# Patient Record
Sex: MALE | Race: BLACK OR AFRICAN AMERICAN | HISPANIC OR LATINO | Employment: FULL TIME | URBAN - METROPOLITAN AREA
[De-identification: names, ages, dates, MRNs, and addresses within clinical notes are randomized per-mention and may not be internally consistent; named-entity substitution may affect disease eponyms.]

---

## 2017-02-24 ENCOUNTER — TELEPHONE (OUTPATIENT)
Dept: HEPATOLOGY | Facility: CLINIC | Age: 37
End: 2017-02-24

## 2017-02-24 NOTE — TELEPHONE ENCOUNTER
Pt due for [facility].     MA spoke with patient lab appt schedule in [facility]. Recall enter for next lab.

## 2017-02-24 NOTE — TELEPHONE ENCOUNTER
----- Message from Linette Levy sent at 2/21/2017  1:53 PM CST -----  Contact: patient wife Ellie   Calling to speak with someone about a letter she received in the mail. Please call

## 2017-03-01 ENCOUNTER — TELEPHONE (OUTPATIENT)
Dept: HEPATOLOGY | Facility: CLINIC | Age: 37
End: 2017-03-01

## 2017-03-01 NOTE — TELEPHONE ENCOUNTER
----- Message from Clarisa Gomez sent at 3/1/2017  1:32 PM CST -----  Contact: Ellie/ boubacar  [phone]  Called regarding a letter she received from [email or web address], have been waiting on a call back [phone]

## 2017-03-01 NOTE — TELEPHONE ENCOUNTER
MA called patient wife, per wife they received a bill from [facility] $408 for fibroscan procedure that we did 11/3/16. She stated that before we do the procedure that we got the approval from [employer] so why now why they received a bill?

## 2017-03-14 ENCOUNTER — TELEPHONE (OUTPATIENT)
Dept: HEPATOLOGY | Facility: CLINIC | Age: 37
End: 2017-03-14

## 2017-03-14 NOTE — TELEPHONE ENCOUNTER
----- Message from Rosangela Salcedo sent at 3/14/2017  1:52 PM CDT -----  Hi, I do apologize not responding sooner,  I reviewed the denial and I copied the reason code that the ins co is stating below that it was experimental and they are not paying based upon the diagnosis. The reason code states below:11[facility]18/16 [facility]  [id number]  Remittance Codes Mapped To Selected  [id number], PX DEEMED EXPERIMENTAL. [id number], PX DEEMED EXPERIMENTAL.    11/18/16 Research on [employer]  [id number]   Remittance Codes Mapped To Selected  N623 [id number]-NOT CVRD WHEN DEEMED UNPROVEN/EXPERIMENTAL

## 2017-03-17 ENCOUNTER — TELEPHONE (OUTPATIENT)
Dept: HEPATOLOGY | Facility: CLINIC | Age: 37
End: 2017-03-17

## 2017-03-17 NOTE — TELEPHONE ENCOUNTER
MA called patient back regarding bill for Fibroscan.     No answer, left message to give office a call back.     E-mail also sent to Benjamin Garrison (fibroscan rep) on appeal information (process) regarding fibroscan denials.

## 2017-03-17 NOTE — TELEPHONE ENCOUNTER
----- Message from Deon Callejas sent at 3/17/2017 11:21 AM CDT -----  Contact: Mrs Adams  Need to get an update ASAP please call

## 2017-03-17 NOTE — TELEPHONE ENCOUNTER
MA spoke with pt's wife, explained to her we are currently working on fibro scan denial. I explained to Mrs. Adams the appeal process can take some time. She can call us back in 1wk for a status update. Pt's wife verbalized understanding.

## 2017-03-21 NOTE — TELEPHONE ENCOUNTER
Spoke with patient's wife stated she will e-mail me a copy of the [facility]

## 2017-04-07 ENCOUNTER — TELEPHONE (OUTPATIENT)
Dept: HEPATOLOGY | Facility: CLINIC | Age: 37
End: 2017-04-07

## 2017-04-07 NOTE — TELEPHONE ENCOUNTER
----- Message from Deon Callejas sent at 4/7/2017 10:26 AM CDT -----  Contact: Mrs Adams  Need to speak with you regarding an appeal please call

## 2017-04-17 ENCOUNTER — TELEPHONE (OUTPATIENT)
Dept: HEPATOLOGY | Facility: CLINIC | Age: 37
End: 2017-04-17

## 2017-04-17 NOTE — TELEPHONE ENCOUNTER
Spoke with Christina ([employer]). Claim was resubmitted, still pending. Pt can call [phone] to check status.     MA spoke with pt's wife, message given. She verbalized understanding. Number given to check status.

## 2017-10-04 ENCOUNTER — OFFICE VISIT (OUTPATIENT)
Dept: INTERNAL MEDICINE | Facility: CLINIC | Age: 37
End: 2017-10-04
Payer: COMMERCIAL

## 2017-10-04 VITALS
DIASTOLIC BLOOD PRESSURE: 60 MMHG | OXYGEN SATURATION: 98 % | WEIGHT: 213.88 LBS | HEIGHT: 68 IN | SYSTOLIC BLOOD PRESSURE: 121 MMHG | BODY MASS INDEX: 32.41 KG/M2 | HEART RATE: 60 BPM

## 2017-10-04 DIAGNOSIS — J30.9 ALLERGIC RHINITIS, UNSPECIFIED CHRONICITY, UNSPECIFIED SEASONALITY, UNSPECIFIED TRIGGER: ICD-10-CM

## 2017-10-04 DIAGNOSIS — Z13.6 ENCOUNTER FOR LIPID SCREENING FOR CARDIOVASCULAR DISEASE: ICD-10-CM

## 2017-10-04 DIAGNOSIS — Z13.220 ENCOUNTER FOR LIPID SCREENING FOR CARDIOVASCULAR DISEASE: ICD-10-CM

## 2017-10-04 DIAGNOSIS — Z13.21 ENCOUNTER FOR VITAMIN DEFICIENCY SCREENING: ICD-10-CM

## 2017-10-04 DIAGNOSIS — R74.8 ELEVATED CPK: ICD-10-CM

## 2017-10-04 DIAGNOSIS — Z00.00 ANNUAL PHYSICAL EXAM: Primary | ICD-10-CM

## 2017-10-04 PROCEDURE — 99999 PR PBB SHADOW E&M-EST. PATIENT-LVL III: CPT | Mod: PBBFAC,,, | Performed by: FAMILY MEDICINE

## 2017-10-04 PROCEDURE — 99395 PREV VISIT EST AGE 18-39: CPT | Mod: S$GLB,,, | Performed by: FAMILY MEDICINE

## 2017-10-04 RX ORDER — AZELASTINE 1 MG/ML
1 SPRAY, METERED NASAL 2 TIMES DAILY
Qty: 30 ML | Refills: 3 | Status: SHIPPED | OUTPATIENT
Start: 2017-10-04 | End: 2018-10-04

## 2017-10-04 RX ORDER — FLUTICASONE PROPIONATE 50 MCG
1 SPRAY, SUSPENSION (ML) NASAL DAILY
Qty: 16 G | Refills: 3 | Status: SHIPPED | OUTPATIENT
Start: 2017-10-04

## 2017-10-04 RX ORDER — AZELASTINE HYDROCHLORIDE, FLUTICASONE PROPIONATE 137; 50 UG/1; UG/1
1 SPRAY, METERED NASAL 2 TIMES DAILY
Qty: 23 G | Refills: 6 | Status: SHIPPED | OUTPATIENT
Start: 2017-10-04 | End: 2017-10-04

## 2017-10-04 NOTE — PROGRESS NOTES
Subjective:       Patient ID: Yoandy Adams is a 37 y.o. male.    Chief Complaint: Annual Exam    HPI   38 y/o male here for annual exam. He denies f/n/v/d/constipation/cp/palpitations/sob/urinary sx.  He gets itchy eyes, scratchy throat, sneezing and post nasal drip, he was seen by an Allergist, Tammie Norton, he was given Dymista which was helpful.  Sleeping ok, appetite good, mood good. He is active and exercising regularly and doing weights.  Hx of alcohol abuse, at age 23 he stopped alcohol,followed by hepatology  Vaccines: not sure about vaccines, he went to [facility] in 2012, will send pic of vaccine card  Eye exam due  Dental due    Review of Systems   Constitutional: Negative for activity change, appetite change, fatigue and fever.   HENT: Positive for congestion, postnasal drip, rhinorrhea and sneezing. Negative for ear discharge, ear pain, sore throat and trouble swallowing.    Eyes: Positive for itching.   Respiratory: Negative for cough and shortness of breath.    Cardiovascular: Negative for chest pain, palpitations and leg swelling.   Gastrointestinal: Negative for constipation, diarrhea, nausea and vomiting.   Genitourinary: Negative for difficulty urinating and dysuria.   Skin: Negative for rash.   Neurological: Negative for dizziness and light-headedness.   Psychiatric/Behavioral: Negative for sleep disturbance.       Objective:      Physical Exam   Constitutional: He appears well-developed and well-nourished.   HENT:   Head: Normocephalic and atraumatic.   Mouth/Throat: No oropharyngeal exudate.   Mild posterior pharyngeal erythema  Inflamed nasal turbinates bilaterally     Neck: Normal range of motion. Neck supple. No thyromegaly present.   Cardiovascular: Normal rate, regular rhythm and normal heart sounds.    Pulmonary/Chest: Effort normal and breath sounds normal. No respiratory distress.   Abdominal: Soft. Bowel sounds are normal. He exhibits no distension.   Musculoskeletal: Normal range of  motion. He exhibits no edema.   Strength 5/5 bilateral upper and lower extremity     Lymphadenopathy:     He has no cervical adenopathy.   Neurological: He is alert.   Skin: Skin is warm and dry.   Psychiatric: He has a normal mood and affect.   Nursing note and vitals reviewed.      Assessment:       1. Annual physical exam    2. Elevated CPK    3. Allergic rhinitis, unspecified chronicity, unspecified seasonality, unspecified trigger    4. Encounter for lipid screening for cardiovascular disease    5. Encounter for vitamin deficiency screening        Plan:       Yoandy was seen today for annual exam.    Diagnoses and all orders for this visit:    Annual physical exam  -     CBC auto differential; Future  -     Comprehensive metabolic panel; Future  -     TSH; Future  -     Hemoglobin A1c; Future    Elevated CPK  -     CK; Future    Allergic rhinitis, unspecified chronicity, unspecified seasonality, unspecified trigger  -     azelastine-fluticasone (DYMISTA) 137-50 mcg/spray Spry nassal spray; 1 spray by Each Nare route 2 (two) times daily.    Encounter for lipid screening for cardiovascular disease  -     Lipid panel; Future    Encounter for vitamin deficiency screening  -     Vitamin D; Future    Other orders  -     Cancel: Tdap Vaccine

## 2017-10-04 NOTE — TELEPHONE ENCOUNTER
----- Message from Brodie Vásquez sent at 10/4/2017 11:31 AM CDT -----  Contact: Leeanne       _chiquis  1st Request  _  2nd Request  _  3rd Request    Please refill the medication(s) listed below. Please call the patient when the prescription(s) is ready for  at this phone number    [phone] (h)         Medication #1: azelastine-fluticasone (DYMISTA) 137-50 mcg/spray Spry nassal spray    # Pt requesting two separate sprays .       Preferred Pharmacy:[facility] [id number] [address]

## 2017-10-05 ENCOUNTER — LAB VISIT (OUTPATIENT)
Dept: LAB | Facility: HOSPITAL | Age: 37
End: 2017-10-05
Attending: FAMILY MEDICINE
Payer: COMMERCIAL

## 2017-10-05 DIAGNOSIS — Z13.21 ENCOUNTER FOR VITAMIN DEFICIENCY SCREENING: ICD-10-CM

## 2017-10-05 DIAGNOSIS — R74.8 ELEVATED CPK: ICD-10-CM

## 2017-10-05 DIAGNOSIS — Z00.00 ANNUAL PHYSICAL EXAM: ICD-10-CM

## 2017-10-05 DIAGNOSIS — Z13.220 ENCOUNTER FOR LIPID SCREENING FOR CARDIOVASCULAR DISEASE: ICD-10-CM

## 2017-10-05 DIAGNOSIS — Z13.6 ENCOUNTER FOR LIPID SCREENING FOR CARDIOVASCULAR DISEASE: ICD-10-CM

## 2017-10-05 LAB
25(OH)D3+25(OH)D2 SERPL-MCNC: 24 NG/ML
ALBUMIN SERPL BCP-MCNC: 3.6 G/DL
ALP SERPL-CCNC: 141 U/L
ALT SERPL W/O P-5'-P-CCNC: 84 U/L
ANION GAP SERPL CALC-SCNC: 8 MMOL/L
AST SERPL-CCNC: 40 U/L
BASOPHILS # BLD AUTO: 0.01 K/UL
BASOPHILS NFR BLD: 0.2 %
BILIRUB SERPL-MCNC: 0.9 MG/DL
BUN SERPL-MCNC: 14 MG/DL
CALCIUM SERPL-MCNC: 9.7 MG/DL
CHLORIDE SERPL-SCNC: 109 MMOL/L
CHOLEST SERPL-MCNC: 213 MG/DL
CHOLEST/HDLC SERPL: 3.7 {RATIO}
CK SERPL-CCNC: 181 U/L
CO2 SERPL-SCNC: 27 MMOL/L
CREAT SERPL-MCNC: 1.1 MG/DL
DIFFERENTIAL METHOD: NORMAL
EOSINOPHIL # BLD AUTO: 0.1 K/UL
EOSINOPHIL NFR BLD: 1.8 %
ERYTHROCYTE [DISTWIDTH] IN BLOOD BY AUTOMATED COUNT: 13.3 %
EST. GFR  (AFRICAN AMERICAN): >60 ML/MIN/1.73 M^2
EST. GFR  (NON AFRICAN AMERICAN): >60 ML/MIN/1.73 M^2
ESTIMATED AVG GLUCOSE: 105 MG/DL
GLUCOSE SERPL-MCNC: 94 MG/DL
HBA1C MFR BLD HPLC: 5.3 %
HCT VFR BLD AUTO: 43.4 %
HDLC SERPL-MCNC: 58 MG/DL
HDLC SERPL: 27.2 %
HGB BLD-MCNC: 14.5 G/DL
LDLC SERPL CALC-MCNC: 139 MG/DL
LYMPHOCYTES # BLD AUTO: 2.3 K/UL
LYMPHOCYTES NFR BLD: 41.5 %
MCH RBC QN AUTO: 30.7 PG
MCHC RBC AUTO-ENTMCNC: 33.4 G/DL
MCV RBC AUTO: 92 FL
MONOCYTES # BLD AUTO: 0.3 K/UL
MONOCYTES NFR BLD: 6.1 %
NEUTROPHILS # BLD AUTO: 2.7 K/UL
NEUTROPHILS NFR BLD: 50 %
NONHDLC SERPL-MCNC: 155 MG/DL
PLATELET # BLD AUTO: 241 K/UL
PMV BLD AUTO: 10.8 FL
POTASSIUM SERPL-SCNC: 3.9 MMOL/L
PROT SERPL-MCNC: 7 G/DL
RBC # BLD AUTO: 4.72 M/UL
SODIUM SERPL-SCNC: 144 MMOL/L
TRIGL SERPL-MCNC: 80 MG/DL
TSH SERPL DL<=0.005 MIU/L-ACNC: 1.19 UIU/ML
WBC # BLD AUTO: 5.42 K/UL

## 2017-10-05 PROCEDURE — 84443 ASSAY THYROID STIM HORMONE: CPT

## 2017-10-05 PROCEDURE — 80061 LIPID PANEL: CPT

## 2017-10-05 PROCEDURE — 83036 HEMOGLOBIN GLYCOSYLATED A1C: CPT

## 2017-10-05 PROCEDURE — 85025 COMPLETE CBC W/AUTO DIFF WBC: CPT

## 2017-10-05 PROCEDURE — 36415 COLL VENOUS BLD VENIPUNCTURE: CPT | Mod: PO

## 2017-10-05 PROCEDURE — 82550 ASSAY OF CK (CPK): CPT

## 2017-10-05 PROCEDURE — 80053 COMPREHEN METABOLIC PANEL: CPT

## 2017-10-05 PROCEDURE — 82306 VITAMIN D 25 HYDROXY: CPT
